# Patient Record
Sex: FEMALE | URBAN - METROPOLITAN AREA
[De-identification: names, ages, dates, MRNs, and addresses within clinical notes are randomized per-mention and may not be internally consistent; named-entity substitution may affect disease eponyms.]

---
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
## 2022-05-26 NOTE — PROGRESS NOTE PEDS - PROBLEM SELECTOR PLAN 2
Writer attempted to contact patient via [place]  regarding follow up of message below.    Patient was unavailable and a voicemail message was left to contact the [facility] at [phone] and ask to speak with a nurse.     If the patient calls back please address message below. Is patient requesting the prescription below? If so the prescription was discontinued on 2/2/22 due to patient reported not taking any more. What is the reason for requesting prescription again?     Brigid Medina RN, BSN  [facility]     Maintenaince fluid: D5 + NS  1000 mL at 100 mL/hr -resolved